# Patient Record
Sex: MALE | Race: WHITE | NOT HISPANIC OR LATINO | URBAN - METROPOLITAN AREA
[De-identification: names, ages, dates, MRNs, and addresses within clinical notes are randomized per-mention and may not be internally consistent; named-entity substitution may affect disease eponyms.]

---

## 2018-05-29 ENCOUNTER — OFFICE VISIT - HEALTHEAST (OUTPATIENT)
Dept: FAMILY MEDICINE | Facility: CLINIC | Age: 38
End: 2018-05-29

## 2018-05-29 DIAGNOSIS — Z02.4 ENCOUNTER FOR EXAMINATION FOR DRIVING LICENSE: ICD-10-CM

## 2018-05-29 LAB
ALBUMIN UR-MCNC: NEGATIVE MG/DL
APPEARANCE UR: CLEAR
BACTERIA #/AREA URNS HPF: ABNORMAL HPF
BILIRUB UR QL STRIP: NEGATIVE
COLOR UR AUTO: YELLOW
GLUCOSE UR STRIP-MCNC: NEGATIVE MG/DL
HGB UR QL STRIP: ABNORMAL
KETONES UR STRIP-MCNC: NEGATIVE MG/DL
LEUKOCYTE ESTERASE UR QL STRIP: NEGATIVE
NITRATE UR QL: NEGATIVE
PH UR STRIP: 5.5 [PH] (ref 5–8)
RBC #/AREA URNS AUTO: ABNORMAL HPF
SP GR UR STRIP: 1.02 (ref 1–1.03)
SQUAMOUS #/AREA URNS AUTO: ABNORMAL LPF
UROBILINOGEN UR STRIP-ACNC: ABNORMAL
WBC #/AREA URNS AUTO: ABNORMAL HPF

## 2018-05-29 ASSESSMENT — MIFFLIN-ST. JEOR: SCORE: 2021.99

## 2020-04-06 ENCOUNTER — COMMUNICATION - HEALTHEAST (OUTPATIENT)
Dept: SCHEDULING | Facility: CLINIC | Age: 40
End: 2020-04-06

## 2021-06-01 VITALS — HEIGHT: 72 IN | WEIGHT: 237 LBS | BODY MASS INDEX: 32.1 KG/M2

## 2021-06-07 NOTE — TELEPHONE ENCOUNTER
New Appointment Needed  What is the reason for the visit:    DOT physical  Provider Preference: Any available  How soon do you need to be seen?: Before May  Waitlist offered?: No  Okay to leave a detailed message:  Yes

## 2021-06-07 NOTE — TELEPHONE ENCOUNTER
Called to patient and informed him we do not have any providers in clinic that can do DOT physicals. Advised patient to contact employer to see who they recommend. Patient stated understanding and thanked for the call

## 2021-06-18 NOTE — PROGRESS NOTES
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    There are no Patient Instructions on file for this visit.    VISION SCREENING:  Acuity  Right eye corrected : 20/20  Left eye corrected: 20/20  Both eyes corrected: /20  Horizontal field of vision: <80 degrees  Right eye: no  Left eye: no    Applicant can recognize and distinguish among traffic control signals and devices showing standard red, green, and gypsy colors? yes    Applicant meets visual acuity requirement only when wearing corrective lenses    Monocular vision: no    HEARING:  Hearing aid used for tests: no  Hearing aid required to meet standard: no    Distance from which individual at which forced whispered voice can first be heard:  Right ear: 5 ft.  Left ear: 5 ft.     CHIEF COMPLAINT  DOT Physical Exam    HISTORY OF PRESENT ILLNESS:  Isidro Christianson is a 38 y.o. male presenting to the clinic today for a DOT physical.The exam today is requested for a recertification.       REVIEW OF SYSTEMS:  General: Negative for overweight, tremor, signs of alcoholism, alcohol or drug abuse.  HEENT: Negative for retinopathy, cataracts, aphakia, glaucoma, or macular degeneration. Negative for scarring of tympanic membrane, occlusion of external ear canals, or perforated eardrums. Negative for irremediable deformities likely to interfere with breathing or swallowing.  Cardiovascular: Negative for heart murmurs, extra sounds, enlarged heart, edema, pacemaker, or implantable defibrillator.   Respiratory: Negative for abnormal chest wall expansion, abnormal respiratory rate, abnormal breath sounds, impaired respiratory function, or cyanosis.   Gastrointestinal: Negative for enlarged liver, enlarged spleen, masses, bruits, hernia, or significant abdominal wall muscle weakness.  Vascular: Negative for abnormal pulse and  "amplitude, carotid or arterial bruits, or varicose veins.   Genitourinary: Negative for hernias.  Musculoskeletal: Negative for loss or impairment of leg, foot, toe, arm, hand, finger, perceptible limp, deformities, atrophy, weakness, insufficient lower limb mobility, insufficient grasp in upper limb to maintain steering wheel , previous surgery, deformities, limitation of motion, or tenderness.   Neurological: Negative for impaired equilibrium, paralysis, coordination or speech impairement, asymmetric deep tendon reflexes, sensory or positional abnormalities, abnormal patellar and Babinski's reflexes, or ataxia.     All other systems negative.    PFSH:  No past medical history on file.  No past surgical history on file.  No family history on file.  Social History     Social History     Marital status:      Spouse name: N/A     Number of children: N/A     Years of education: N/A     Occupational History     Not on file.     Social History Main Topics     Smoking status: Never Smoker     Smokeless tobacco: Never Used     Alcohol use Not on file     Drug use: Not on file     Sexual activity: Not on file     Other Topics Concern     Not on file     Social History Narrative     No narrative on file       PHYSICAL EXAM:  Vitals:    05/29/18 1431   BP: 130/86   Pulse: 94   Weight: (!) 237 lb (107.5 kg)   Height: 6' 0.25\" (1.835 m)     Body mass index is 31.92 kg/(m^2).    QUALITY MEASURES:        DATA REVIEWED:  Old records reviewed (2):  Decision to obtain records/history (1):  Radiology reports ordered or reviewed (1):  Labs ordered or reviewed (1):  Medicine tests ordered or reviewed (1):   Independent review or X-ray imaging or EKG (2 each):                 "